# Patient Record
Sex: FEMALE | Race: OTHER | HISPANIC OR LATINO | URBAN - METROPOLITAN AREA
[De-identification: names, ages, dates, MRNs, and addresses within clinical notes are randomized per-mention and may not be internally consistent; named-entity substitution may affect disease eponyms.]

---

## 2019-05-21 ENCOUNTER — EMERGENCY (EMERGENCY)
Facility: HOSPITAL | Age: 39
LOS: 1 days | Discharge: HOME | End: 2019-05-21
Attending: EMERGENCY MEDICINE
Payer: SELF-PAY

## 2019-05-21 VITALS
DIASTOLIC BLOOD PRESSURE: 89 MMHG | RESPIRATION RATE: 16 BRPM | OXYGEN SATURATION: 98 % | TEMPERATURE: 98 F | SYSTOLIC BLOOD PRESSURE: 143 MMHG | WEIGHT: 179.9 LBS | HEART RATE: 90 BPM

## 2019-05-21 PROCEDURE — 99283 EMERGENCY DEPT VISIT LOW MDM: CPT

## 2019-05-21 RX ORDER — CEPHALEXIN 500 MG
1 CAPSULE ORAL
Qty: 28 | Refills: 0
Start: 2019-05-21 | End: 2019-05-27

## 2019-05-21 RX ORDER — AZTREONAM 2 G
1 VIAL (EA) INJECTION
Qty: 14 | Refills: 0
Start: 2019-05-21 | End: 2019-05-27

## 2019-05-21 RX ORDER — CEPHALEXIN 500 MG
500 CAPSULE ORAL ONCE
Refills: 0 | Status: COMPLETED | OUTPATIENT
Start: 2019-05-21 | End: 2019-05-21

## 2019-05-21 RX ORDER — IBUPROFEN 200 MG
800 TABLET ORAL ONCE
Refills: 0 | Status: COMPLETED | OUTPATIENT
Start: 2019-05-21 | End: 2019-05-21

## 2019-05-21 RX ORDER — IBUPROFEN 200 MG
1 TABLET ORAL
Qty: 40 | Refills: 0
Start: 2019-05-21 | End: 2019-05-30

## 2019-05-21 NOTE — ED PROVIDER NOTE - CLINICAL SUMMARY MEDICAL DECISION MAKING FREE TEXT BOX
37 y/o F presents to the ED with skin infection over midsternal chest wall area x 1 week. Pt was given Keflex and Bactrim, and Ibuprofen for pain. Pt instructed to f/u in 2-3 days with PMD or ED for evaluation and given careful return to ED precautions.

## 2019-05-21 NOTE — ED PROVIDER NOTE - PHYSICAL EXAMINATION
Chest wall: small area of mild erythema, 3 cm in diameter over midsternal chest wall, mild induration, no fluctuance, no d/c, no pustules, no vesicles

## 2019-05-21 NOTE — ED PROVIDER NOTE - NSFOLLOWUPINSTRUCTIONS_ED_ALL_ED_FT
Take medications as prescribed.  Follow up with your PMD in 2 days for reevaluation.    Return to ED if you develop fever >100.8F, or increased swelling/redness beyond the markings.

## 2019-05-21 NOTE — ED PROVIDER NOTE - OBJECTIVE STATEMENT
37 y/o F with no significant PMHx and no significant PSHX presents to the ED with c/o pain and swelling over chest wall x 1 week. Pt states that 1 week ago she noticed a little bump on the skin of her mid-sternal area, so she popped and scratched over the area. Pt notes that she developed increased redness and pain since then. Pt reports that she has been unable to schedule an appointment with her PMD until 2 days from today, so she came to the ED for further evaluation. Pt denies d/c, bleeding, fever, insect bites, skin lesions, hx of skin infections, or any other complaints. NKDA.

## 2019-05-22 VITALS
DIASTOLIC BLOOD PRESSURE: 69 MMHG | OXYGEN SATURATION: 96 % | SYSTOLIC BLOOD PRESSURE: 118 MMHG | RESPIRATION RATE: 16 BRPM | TEMPERATURE: 98 F | HEART RATE: 85 BPM

## 2019-05-22 PROCEDURE — 99284 EMERGENCY DEPT VISIT MOD MDM: CPT

## 2019-05-22 RX ADMIN — Medication 800 MILLIGRAM(S): at 00:22

## 2019-05-22 RX ADMIN — Medication 1 TABLET(S): at 00:22

## 2019-05-22 RX ADMIN — Medication 500 MILLIGRAM(S): at 00:23

## 2019-05-22 NOTE — ED ADULT NURSE NOTE - OBJECTIVE STATEMENT
abscess to middle chest x a week. No acute distress noted, denies chest pain, no shortness of breath indicated. Safety maintained.

## 2020-12-26 ENCOUNTER — EMERGENCY (EMERGENCY)
Facility: HOSPITAL | Age: 40
LOS: 1 days | Discharge: HOME | End: 2020-12-26
Attending: EMERGENCY MEDICINE | Admitting: EMERGENCY MEDICINE
Payer: MEDICAID

## 2020-12-26 VITALS
HEART RATE: 113 BPM | DIASTOLIC BLOOD PRESSURE: 81 MMHG | OXYGEN SATURATION: 93 % | HEIGHT: 61 IN | WEIGHT: 179.9 LBS | SYSTOLIC BLOOD PRESSURE: 105 MMHG | RESPIRATION RATE: 18 BRPM | TEMPERATURE: 98 F

## 2020-12-26 VITALS
SYSTOLIC BLOOD PRESSURE: 110 MMHG | RESPIRATION RATE: 18 BRPM | TEMPERATURE: 99 F | HEART RATE: 85 BPM | DIASTOLIC BLOOD PRESSURE: 74 MMHG | OXYGEN SATURATION: 96 %

## 2020-12-26 LAB
ALBUMIN SERPL ELPH-MCNC: 3.7 G/DL — SIGNIFICANT CHANGE UP (ref 3.3–5)
ALP SERPL-CCNC: 56 U/L — SIGNIFICANT CHANGE UP (ref 40–120)
ALT FLD-CCNC: 32 U/L — SIGNIFICANT CHANGE UP (ref 10–45)
ANION GAP SERPL CALC-SCNC: 14 MMOL/L — SIGNIFICANT CHANGE UP (ref 5–17)
APTT BLD: 28.9 SEC — SIGNIFICANT CHANGE UP (ref 27.5–35.5)
AST SERPL-CCNC: 44 U/L — HIGH (ref 10–40)
BASOPHILS # BLD AUTO: 0 K/UL — SIGNIFICANT CHANGE UP (ref 0–0.2)
BASOPHILS NFR BLD AUTO: 0 % — SIGNIFICANT CHANGE UP (ref 0–2)
BILIRUB SERPL-MCNC: 0.4 MG/DL — SIGNIFICANT CHANGE UP (ref 0.2–1.2)
BUN SERPL-MCNC: 5 MG/DL — LOW (ref 7–23)
CALCIUM SERPL-MCNC: 8.5 MG/DL — SIGNIFICANT CHANGE UP (ref 8.4–10.5)
CHLORIDE SERPL-SCNC: 99 MMOL/L — SIGNIFICANT CHANGE UP (ref 96–108)
CO2 SERPL-SCNC: 23 MMOL/L — SIGNIFICANT CHANGE UP (ref 22–31)
CREAT SERPL-MCNC: 0.52 MG/DL — SIGNIFICANT CHANGE UP (ref 0.5–1.3)
EOSINOPHIL # BLD AUTO: 0 K/UL — SIGNIFICANT CHANGE UP (ref 0–0.5)
EOSINOPHIL NFR BLD AUTO: 0 % — SIGNIFICANT CHANGE UP (ref 0–6)
GAS PNL BLDV: SIGNIFICANT CHANGE UP
GLUCOSE SERPL-MCNC: 100 MG/DL — HIGH (ref 70–99)
HCT VFR BLD CALC: 39.7 % — SIGNIFICANT CHANGE UP (ref 34.5–45)
HGB BLD-MCNC: 13.7 G/DL — SIGNIFICANT CHANGE UP (ref 11.5–15.5)
INR BLD: 1.13 RATIO — SIGNIFICANT CHANGE UP (ref 0.88–1.16)
LYMPHOCYTES # BLD AUTO: 1.05 K/UL — SIGNIFICANT CHANGE UP (ref 1–3.3)
LYMPHOCYTES # BLD AUTO: 26.3 % — SIGNIFICANT CHANGE UP (ref 13–44)
MCHC RBC-ENTMCNC: 29.9 PG — SIGNIFICANT CHANGE UP (ref 27–34)
MCHC RBC-ENTMCNC: 34.5 GM/DL — SIGNIFICANT CHANGE UP (ref 32–36)
MCV RBC AUTO: 86.7 FL — SIGNIFICANT CHANGE UP (ref 80–100)
MONOCYTES # BLD AUTO: 0.49 K/UL — SIGNIFICANT CHANGE UP (ref 0–0.9)
MONOCYTES NFR BLD AUTO: 12.3 % — SIGNIFICANT CHANGE UP (ref 2–14)
NEUTROPHILS # BLD AUTO: 2.43 K/UL — SIGNIFICANT CHANGE UP (ref 1.8–7.4)
NEUTROPHILS NFR BLD AUTO: 60.5 % — SIGNIFICANT CHANGE UP (ref 43–77)
PLATELET # BLD AUTO: 174 K/UL — SIGNIFICANT CHANGE UP (ref 150–400)
POTASSIUM SERPL-MCNC: 4.6 MMOL/L — SIGNIFICANT CHANGE UP (ref 3.5–5.3)
POTASSIUM SERPL-SCNC: 4.6 MMOL/L — SIGNIFICANT CHANGE UP (ref 3.5–5.3)
PROT SERPL-MCNC: 7.5 G/DL — SIGNIFICANT CHANGE UP (ref 6–8.3)
PROTHROM AB SERPL-ACNC: 13.5 SEC — SIGNIFICANT CHANGE UP (ref 10.6–13.6)
RAPID RVP RESULT: DETECTED
RBC # BLD: 4.58 M/UL — SIGNIFICANT CHANGE UP (ref 3.8–5.2)
RBC # FLD: 12.5 % — SIGNIFICANT CHANGE UP (ref 10.3–14.5)
SARS-COV-2 RNA SPEC QL NAA+PROBE: DETECTED
SODIUM SERPL-SCNC: 136 MMOL/L — SIGNIFICANT CHANGE UP (ref 135–145)
WBC # BLD: 4.01 K/UL — SIGNIFICANT CHANGE UP (ref 3.8–10.5)
WBC # FLD AUTO: 4.01 K/UL — SIGNIFICANT CHANGE UP (ref 3.8–10.5)

## 2020-12-26 PROCEDURE — 71045 X-RAY EXAM CHEST 1 VIEW: CPT

## 2020-12-26 PROCEDURE — 85014 HEMATOCRIT: CPT

## 2020-12-26 PROCEDURE — 84295 ASSAY OF SERUM SODIUM: CPT

## 2020-12-26 PROCEDURE — 85025 COMPLETE CBC W/AUTO DIFF WBC: CPT

## 2020-12-26 PROCEDURE — 99284 EMERGENCY DEPT VISIT MOD MDM: CPT | Mod: 25

## 2020-12-26 PROCEDURE — 93010 ELECTROCARDIOGRAM REPORT: CPT

## 2020-12-26 PROCEDURE — 85610 PROTHROMBIN TIME: CPT

## 2020-12-26 PROCEDURE — 83605 ASSAY OF LACTIC ACID: CPT

## 2020-12-26 PROCEDURE — 82947 ASSAY GLUCOSE BLOOD QUANT: CPT

## 2020-12-26 PROCEDURE — 93005 ELECTROCARDIOGRAM TRACING: CPT

## 2020-12-26 PROCEDURE — 82435 ASSAY OF BLOOD CHLORIDE: CPT

## 2020-12-26 PROCEDURE — 99285 EMERGENCY DEPT VISIT HI MDM: CPT

## 2020-12-26 PROCEDURE — 84132 ASSAY OF SERUM POTASSIUM: CPT

## 2020-12-26 PROCEDURE — 80053 COMPREHEN METABOLIC PANEL: CPT

## 2020-12-26 PROCEDURE — 82330 ASSAY OF CALCIUM: CPT

## 2020-12-26 PROCEDURE — 96374 THER/PROPH/DIAG INJ IV PUSH: CPT

## 2020-12-26 PROCEDURE — 85730 THROMBOPLASTIN TIME PARTIAL: CPT

## 2020-12-26 PROCEDURE — 71045 X-RAY EXAM CHEST 1 VIEW: CPT | Mod: 26

## 2020-12-26 PROCEDURE — 82803 BLOOD GASES ANY COMBINATION: CPT

## 2020-12-26 PROCEDURE — 85018 HEMOGLOBIN: CPT

## 2020-12-26 PROCEDURE — 0225U NFCT DS DNA&RNA 21 SARSCOV2: CPT

## 2020-12-26 RX ORDER — DEXAMETHASONE 0.5 MG/5ML
6 ELIXIR ORAL ONCE
Refills: 0 | Status: COMPLETED | OUTPATIENT
Start: 2020-12-26 | End: 2020-12-26

## 2020-12-26 RX ADMIN — Medication 6 MILLIGRAM(S): at 13:35

## 2020-12-26 NOTE — ED PROVIDER NOTE - CHIEF COMPLAINT
SCRIBE #1 NOTE: I, Scarlett Armas, am scribing for, and in the presence of, Navneet Liriano Jr., MD. I have scribed the entire note.       History     Chief Complaint   Patient presents with    Leg Pain     left, edema     Review of patient's allergies indicates:  No Known Allergies      History of Present Illness     HPI    4/28/2020, 2:43 AM  History obtained from the patient      History of Present Illness: Diila Talley is a 75 y.o. female patient with a PMHx of HTN, DM who presents to the Emergency Department for evaluation of LLE pain/swelling which onset gradually yesterday AM. Symptoms are constant and moderate in severity. No mitigating or exacerbating factors reported. No other sxs reported. Patient denies any fever, chills, CP, cough, SOB, N/V, palpitations, extremity numbness/weaknesss, trauma and all other sxs at this time. Pt reports no hx of DVTs. No further complaints or concerns at this time.  Patient has no history of trauma.  No history of similar.      Arrival mode: Personal vehicle      PCP: Leif Townsend MD        Past Medical History:  Past Medical History:   Diagnosis Date    Diabetes     HTN (hypertension)     Neuropathy     Obese        Past Surgical History:  Past Surgical History:   Procedure Laterality Date    UMBILICAL HERNIA REPAIR           Family History:  Family History   Problem Relation Age of Onset    Diabetes Sister     Hypertension Sister        Social History:  Social History     Tobacco Use    Smoking status: Never Smoker    Smokeless tobacco: Never Used   Substance and Sexual Activity    Alcohol use: No    Drug use: No    Sexual activity: Not Currently     Partners: Male     Birth control/protection: None        Review of Systems     Review of Systems   Constitutional: Negative for chills, diaphoresis and fever.   HENT: Negative for sore throat.    Respiratory: Negative for cough and shortness of breath.    Cardiovascular: Positive for leg swelling (/pain; LLE).  Negative for chest pain and palpitations.   Gastrointestinal: Negative for nausea and vomiting.   Genitourinary: Negative for dysuria.   Musculoskeletal: Negative for back pain.   Skin: Negative for rash.   Neurological: Negative for dizziness, weakness and numbness.   Hematological: Does not bruise/bleed easily.   All other systems reviewed and are negative.       Physical Exam     Initial Vitals [04/28/20 0225]   BP Pulse Resp Temp SpO2   (!) 148/88 78 18 98.2 °F (36.8 °C) 100 %      MAP       --          Physical Exam  Nursing Notes and Vital Signs Reviewed.  Constitutional: Patient is in no acute distress. Well-developed and well-nourished.  Head: Atraumatic. Normocephalic.  Eyes: PERRL. EOM intact. Conjunctivae are not pale. No scleral icterus.  ENT: Mucous membranes are moist. Oropharynx is clear and symmetric.    Neck: Supple. Full ROM. No lymphadenopathy.  Cardiovascular: Regular rate. Regular rhythm. No murmurs, rubs, or gallops. Distal pulses are 2+ and symmetric.  Pulmonary/Chest: No respiratory distress. Clear to auscultation bilaterally. No wheezing or rales.  Abdominal: Soft and non-distended.  There is no tenderness.  No rebound, guarding, or rigidity. Good bowel sounds.  Genitourinary: No CVA tenderness  Musculoskeletal: Moves all extremities. No obvious deformities. LLE:  Tenderness to posterior calf and thigh with mild edema noted. No skin breaks. No cellulitis or signs of infection. ROM is normal. Cap refill distally is <2 seconds. DP and PT pulses are equal and 2+ bilaterally. No motor deficit. No distal sensory deficit there is pain with passive range of motion left knee.  Left ankle.  No hip tenderness.  Skin: Warm and dry.  There is no erythema.  There is no crepitus.  There is no subcu air.  Neurological:  Alert, awake, and appropriate.  Normal speech.  No acute focal neurological deficits are appreciated.  Sensation intact foot.  Psychiatric: Normal affect. Good eye contact. Appropriate in  "content.     ED Course   Procedures  ED Vital Signs:  Vitals:    04/28/20 0225 04/28/20 0300 04/28/20 0400   BP: (!) 148/88 137/64 136/68   Pulse: 78 77 71   Resp: 18 (!) 23 16   Temp: 98.2 °F (36.8 °C)     SpO2: 100% 100% 100%   Weight: 100.2 kg (221 lb)     Height: 5' 5" (1.651 m)         Abnormal Lab Results:  Labs Reviewed   CBC W/ AUTO DIFFERENTIAL - Abnormal; Notable for the following components:       Result Value    RBC 3.82 (*)     Hemoglobin 10.8 (*)     Hematocrit 34.3 (*)     Mean Corpuscular Hemoglobin Conc 31.5 (*)     All other components within normal limits   COMPREHENSIVE METABOLIC PANEL - Abnormal; Notable for the following components:    Calcium 10.6 (*)     Albumin 3.1 (*)     AST 9 (*)     ALT 6 (*)     eGFR if  57 (*)     eGFR if non  49 (*)     All other components within normal limits   URIC ACID - Abnormal; Notable for the following components:    Uric Acid 11.3 (*)     All other components within normal limits   C-REACTIVE PROTEIN - Abnormal; Notable for the following components:    CRP 19.1 (*)     All other components within normal limits   APTT   PROTIME-INR        All Lab Results:  Results for orders placed or performed during the hospital encounter of 04/28/20   CBC auto differential   Result Value Ref Range    WBC 9.54 3.90 - 12.70 K/uL    RBC 3.82 (L) 4.00 - 5.40 M/uL    Hemoglobin 10.8 (L) 12.0 - 16.0 g/dL    Hematocrit 34.3 (L) 37.0 - 48.5 %    Mean Corpuscular Volume 90 82 - 98 fL    Mean Corpuscular Hemoglobin 28.3 27.0 - 31.0 pg    Mean Corpuscular Hemoglobin Conc 31.5 (L) 32.0 - 36.0 g/dL    RDW 12.9 11.5 - 14.5 %    Platelets 347 150 - 350 K/uL    MPV 9.8 9.2 - 12.9 fL    Immature Granulocytes 0.4 0.0 - 0.5 %    Gran # (ANC) 5.7 1.8 - 7.7 K/uL    Immature Grans (Abs) 0.04 0.00 - 0.04 K/uL    Lymph # 2.6 1.0 - 4.8 K/uL    Mono # 0.8 0.3 - 1.0 K/uL    Eos # 0.3 0.0 - 0.5 K/uL    Baso # 0.04 0.00 - 0.20 K/uL    nRBC 0 0 /100 WBC    Gran% 60.1 38.0 " - 73.0 %    Lymph% 27.3 18.0 - 48.0 %    Mono% 8.4 4.0 - 15.0 %    Eosinophil% 3.4 0.0 - 8.0 %    Basophil% 0.4 0.0 - 1.9 %    Differential Method Automated    Comprehensive metabolic panel   Result Value Ref Range    Sodium 136 136 - 145 mmol/L    Potassium 3.9 3.5 - 5.1 mmol/L    Chloride 104 95 - 110 mmol/L    CO2 23 23 - 29 mmol/L    Glucose 101 70 - 110 mg/dL    BUN, Bld 12 8 - 23 mg/dL    Creatinine 1.1 0.5 - 1.4 mg/dL    Calcium 10.6 (H) 8.7 - 10.5 mg/dL    Total Protein 7.7 6.0 - 8.4 g/dL    Albumin 3.1 (L) 3.5 - 5.2 g/dL    Total Bilirubin 0.4 0.1 - 1.0 mg/dL    Alkaline Phosphatase 110 55 - 135 U/L    AST 9 (L) 10 - 40 U/L    ALT 6 (L) 10 - 44 U/L    Anion Gap 9 8 - 16 mmol/L    eGFR if African American 57 (A) >60 mL/min/1.73 m^2    eGFR if non African American 49 (A) >60 mL/min/1.73 m^2   APTT   Result Value Ref Range    aPTT 25.8 21.0 - 32.0 sec   Protime-INR   Result Value Ref Range    Prothrombin Time 10.0 9.0 - 12.5 sec    INR 0.9 0.8 - 1.2   Uric acid   Result Value Ref Range    Uric Acid 11.3 (H) 2.4 - 5.7 mg/dL   C-reactive protein   Result Value Ref Range    CRP 19.1 (H) 0.0 - 8.2 mg/L         Imaging Results:  Imaging Results          US Lower Extremity Veins Left (In process)                4:25 AM: Per STAT radiology, pt's US results: no DVT.           The Emergency Provider reviewed the vital signs and test results, which are outlined above.     ED Discussion     4:24 AM: Reassessed pt at this time. Discussed with pt all pertinent ED information and results. Discussed pt dx and plan of tx. Gave pt all f/u and return to the ED instructions. All questions and concerns were addressed at this time. Pt expresses understanding of information and instructions, and is comfortable with plan to discharge. Pt is stable for discharge.    I discussed with patient and/or family/caretaker that evaluation in the ED does not suggest any emergent or life threatening medical conditions requiring immediate  intervention beyond what was provided in the ED, and I believe patient is safe for discharge.  Regardless, an unremarkable evaluation in the ED does not preclude the development or presence of a serious of life threatening condition. As such, patient was instructed to return immediately for any worsening or change in current symptoms.    4:27 AM  Patient is stable nontoxic.  There is no evidence of cellulitis on exam.  Patient does have what appears to be acute you out with subsequent pain.  Will treat symptomatically with close follow-up.  Discussed this with the patient at length.  She verbalized understanding agreement with instructions and seems reliable.  She is safe for discharge my opinion.       Medical Decision Making:   Clinical Tests:   Lab Tests: Ordered and Reviewed  Radiological Study: Ordered and Reviewed           ED Medication(s):  Medications   morphine injection 4 mg (4 mg Intravenous Given 4/28/20 0354)   ondansetron injection 4 mg (4 mg Intravenous Given 4/28/20 0353)       New Prescriptions    HYDROCODONE-ACETAMINOPHEN (NORCO) 5-325 MG PER TABLET    Take 1 tablet by mouth every 6 (six) hours as needed.       Follow-up Information     Leif Townsend MD In 2 days.    Specialty:  Family Medicine  Contact information:  [address] [address]  [phone]             Leif Townsend MD.    Specialty:  Family Medicine  Contact information:  [address] [address]  [phone]                       Scribe Attestation:   Scribe #1: I performed the above scribed service and the documentation accurately describes the services I performed. I attest to the accuracy of the note.     Attending:   Physician Attestation Statement for Scribe #1: I, Navneet Liriano Jr., MD, personally performed the services described in this documentation, as scribed by Scarlett Armas, in my presence, and it is both accurate and complete.           Clinical Impression        ICD-10-CM ICD-9-CM   1. Acute gout of left knee, unspecified cause M10.9 274.01   2. Edema R60.9 782.3       Disposition:   Disposition: Discharged  Condition: Stable         Navneet Liriano Jr., MD  04/28/20 0427     The patient is a 39y Female complaining of difficulty breathing.

## 2020-12-26 NOTE — ED PROVIDER NOTE - CLINICAL SUMMARY MEDICAL DECISION MAKING FREE TEXT BOX
39F Covid+ 6 days prior presents with sob and butler of 1 day duration. Concern for worsening symptoms. Sating 92 on RA with some increase work of breathing especially after coughing. Will get ekg, labs, coags, blood gas, CXR, give decadron and plan to admit 39F Covid+ 6 days prior presents with sob and butler of 1 day duration. Concern for worsening symptoms. Sating 97 on RA with some increase work of breathing especially after coughing Sating 95. Will get ekg, labs, coags, blood gas, CXR, give decadron and reassess

## 2020-12-26 NOTE — ED ADULT NURSE NOTE - OBJECTIVE STATEMENT
38 y/o female arrives to the ER complaining of SOB  Pt is A&Ox4, speaking coherently. Pt states having difficulty breathing when coughing x 1 day. Pt was COVID positive tested 6 days ago. Pt states having fever and dos episodes of diarrhea the last two days.  On assessment airway is patent, breathing spontaneously and unlabored, skin is dry, warm and color appropriate to race. abdomen is soft, non distended, non tender, full  ROM in all extremities. Pt denies  chest pain, dizziness, N/V, urinary symptoms . Comfort measures provided. call bell within reach, bed locked in the lowest position. will continue to reassess .

## 2020-12-26 NOTE — ED PROVIDER NOTE - PHYSICAL EXAMINATION
Gen: AAOx3, non-toxic  Head: NCAT  HEENT: EOMI, oral mucosa moist, normal conjunctiva  Lung: CTAB, mild increase work of breathing, no wheezes/rhonchi/rales B/L, speaking in full sentences  CV: RRR, no murmurs, rubs or gallops  Abd: soft, NTND, no guarding, no CVA tenderness  MSK: no visible deformities  Neuro: No focal sensory or motor deficits  Skin: Warm, well perfused, no rash  Psych: normal affect.   ~Armand Randolph M.D. Resident Gen: AAOx3, non-toxic  Head: NCAT  HEENT: EOMI, oral mucosa moist, normal conjunctiva  Lung: CTAB, no wheezes/rhonchi/rales B/L, speaking in full sentences  CV: RRR, no murmurs, rubs or gallops  Abd: soft, NTND, no guarding, no CVA tenderness  MSK: no visible deformities  Neuro: No focal sensory or motor deficits  Skin: Warm, well perfused, no rash  Psych: normal affect.   ~Armand Randolph M.D. Resident

## 2020-12-26 NOTE — ED PROVIDER NOTE - NSFOLLOWUPINSTRUCTIONS_ED_ALL_ED_FT
No signs of emergency medical condition on today's workup.  Presumptive diagnosis made, but further evaluation may be required by your primary care doctor or specialist for a definitive diagnosis.    Please follow up with your primary care physician in 24-48 hours  A copy of your results have been provided to you  Please come back if any of the following: Fever, chest pain, shortness of breath, abdominal pain, nausea, vomiting, difficulty walking or any major concern

## 2020-12-26 NOTE — ED ADULT NURSE REASSESSMENT NOTE - NS ED NURSE REASSESS COMMENT FT1
pt ambulate to check the O2 saturation. satting 96% RA after ambulating and talking. Pt tolerated well the ambulation.VSS

## 2020-12-26 NOTE — ED PROVIDER NOTE - ATTENDING CONTRIBUTION TO CARE
I supervised care. I supervised care.    39yr healthy F w COVID 1wks symptoms. had pulseox but unable to use it. reports no sick contacts  appears healthy and in no distress. had desat to 92. will do labs, cxr, and if work up unremarkable and amb O2 ok, may follow up as outpatient.

## 2020-12-26 NOTE — ED PROVIDER NOTE - OBJECTIVE STATEMENT
39F Covid+ 6 days prior presents with sob and butler of 1 day duration. Reports intermittent fevers and diarrhea along with non productive cough but presented today due to worsening sob since yesterday. Reports some mild nausea, but denies chest pain, abd pain, vomiting, back pain.

## 2020-12-26 NOTE — ED PROVIDER NOTE - PATIENT PORTAL LINK FT
You can access the FollowMyHealth Patient Portal offered by [facility] by registering at the following website: [email or web address]. By joining [facility]’s FollowMyHealth portal, you will also be able to view your health information using other applications (apps) compatible with our system.

## 2020-12-26 NOTE — ED PROVIDER NOTE - PROGRESS NOTE DETAILS
Tomasa PRINCE: Patient reassessed, reports improvement. Ambulatory sat 96 on RA. Tomasa PRINCE: Patient reassessed, reports improvement and wants to be discharged. Ambulatory sat 96 on RA.

## 2023-03-10 NOTE — ED PROVIDER NOTE - DISPOSITION TYPE
DISCHARGE Klisyri Pregnancy And Lactation Text: It is unknown if this medication can harm a developing fetus or if it is excreted in breast milk.

## 2025-04-15 NOTE — ED PROVIDER NOTE - NS ED ROS FT
GENERAL: No fever or chills, EYES: no change in vision, HEENT: no trouble swallowing or speaking, CARDIAC: no chest pain, PULMONARY: +cough,  +SOB, GI: no abdominal pain, no nausea, no vomiting, no diarrhea or constipation, : No changes in urination, SKIN: no rashes, NEURO: no headache,  MSK: No joint pain ~Armand Randolph M.D. Resident
04-15    141  |  101  |  51.2[H]  ----------------------------<  97  3.8   |  27.0  |  5.59[H]    Ca    8.6      15 Apr 2025 02:58  Phos  5.0     04-15  Mg     2.1     04-15    TPro  6.7  /  Alb  3.9  /  TBili  1.3  /  DBili  x   /  AST  16  /  ALT  11  /  AlkPhos  94  04-15  A1C with Estimated Average Glucose Result: 4.2 % (01-08-25 @ 07:17)  A1C with Estimated Average Glucose Result: 4.2 % (12-02-24 @ 04:38)  A1C with Estimated Average Glucose Result: 4.7 % (08-13-24 @ 10:16)